# Patient Record
Sex: FEMALE | Race: WHITE | NOT HISPANIC OR LATINO | URBAN - METROPOLITAN AREA
[De-identification: names, ages, dates, MRNs, and addresses within clinical notes are randomized per-mention and may not be internally consistent; named-entity substitution may affect disease eponyms.]

---

## 2020-10-29 ENCOUNTER — OFFICE VISIT (OUTPATIENT)
Dept: URBAN - METROPOLITAN AREA TELEHEALTH 2 | Facility: TELEHEALTH | Age: 40
End: 2020-10-29

## 2020-10-29 RX ORDER — TRAZODONE HYDROCHLORIDE 50 MG/1
TAKE 1 TABLET (50 MG) BY ORAL ROUTE ONCE DAILY AT BEDTIME TABLET ORAL 1
Qty: 0 | Refills: 0 | Status: ACTIVE | COMMUNITY
Start: 1900-01-01 | End: 1900-01-01

## 2020-10-29 RX ORDER — ESZOPICLONE 3 MG/1
TAKE 1 TABLET (3 MG) BY ORAL ROUTE ONCE DAILY AT BEDTIME TABLET, COATED ORAL 1
Qty: 0 | Refills: 0 | Status: ACTIVE | COMMUNITY
Start: 1900-01-01 | End: 1900-01-01

## 2020-10-29 RX ORDER — HYOSCYAMINE SULFATE 0.12 MG/1
PLACE 1 TABLET UNDER TONGUE BY TRANSLINGUAL ROUTE 3 TIMES A DAY AS NEEDED FOR 30 DAYS TABLET, ORALLY DISINTEGRATING ORAL
Qty: 90 | Refills: 3 | Status: ACTIVE | COMMUNITY
Start: 2018-06-06 | End: 1900-01-01

## 2020-11-02 ENCOUNTER — WEB ENCOUNTER (OUTPATIENT)
Dept: URBAN - METROPOLITAN AREA CLINIC 80 | Facility: CLINIC | Age: 40
End: 2020-11-02

## 2020-11-02 ENCOUNTER — OFFICE VISIT (OUTPATIENT)
Dept: URBAN - METROPOLITAN AREA CLINIC 80 | Facility: CLINIC | Age: 40
End: 2020-11-02
Payer: COMMERCIAL

## 2020-11-02 DIAGNOSIS — Z80.0 FAMILY HISTORY OF COLON CANCER: ICD-10-CM

## 2020-11-02 DIAGNOSIS — K21.9 GERD: ICD-10-CM

## 2020-11-02 DIAGNOSIS — Z86.010 HISTORY OF COLON POLYPS: ICD-10-CM

## 2020-11-02 DIAGNOSIS — R19.4 CHANGE IN BOWEL HABITS: ICD-10-CM

## 2020-11-02 DIAGNOSIS — K92.1 MELENA: ICD-10-CM

## 2020-11-02 DIAGNOSIS — R10.9 ABDOMINAL PAIN: ICD-10-CM

## 2020-11-02 PROCEDURE — G8417 CALC BMI ABV UP PARAM F/U: HCPCS | Performed by: INTERNAL MEDICINE

## 2020-11-02 PROCEDURE — 99214 OFFICE O/P EST MOD 30 MIN: CPT | Performed by: INTERNAL MEDICINE

## 2020-11-02 PROCEDURE — G8484 FLU IMMUNIZE NO ADMIN: HCPCS | Performed by: INTERNAL MEDICINE

## 2020-11-02 PROCEDURE — G8427 DOCREV CUR MEDS BY ELIG CLIN: HCPCS | Performed by: INTERNAL MEDICINE

## 2020-11-02 PROCEDURE — G9903 PT SCRN TBCO ID AS NON USER: HCPCS | Performed by: INTERNAL MEDICINE

## 2020-11-02 RX ORDER — CLONAZEPAM 1 MG/1
1 TABLET TABLET ORAL ONCE A DAY
Status: ACTIVE | COMMUNITY

## 2020-11-02 RX ORDER — HYOSCYAMINE SULFATE 0.12 MG/1
PLACE 1 TABLET UNDER TONGUE BY TRANSLINGUAL ROUTE 3 TIMES A DAY AS NEEDED FOR 30 DAYS TABLET, ORALLY DISINTEGRATING ORAL
Qty: 90 | Refills: 3 | Status: DISCONTINUED | COMMUNITY
Start: 2018-06-06

## 2020-11-02 RX ORDER — SODIUM, POTASSIUM,MAG SULFATES 17.5-3.13G
17.5-13.3-1.6 GM/177ML SOLUTION, RECONSTITUTED, ORAL ORAL
Qty: 177 MILLILITER | Refills: 0 | OUTPATIENT

## 2020-11-02 RX ORDER — PANTOPRAZOLE SODIUM 40 MG/1
1 TABLET TABLET, DELAYED RELEASE ORAL ONCE A DAY
Qty: 90 TABLET | Refills: 3 | OUTPATIENT

## 2020-11-02 RX ORDER — HYOSCYAMINE SULFATE 0.12 MG/1
1 TABLET UNDER THE TONGUE AND ALLOW TO DISSOLVE  AS NEEDED TABLET ORAL; SUBLINGUAL THREE TIMES A DAY
Qty: 90 | Refills: 1 | OUTPATIENT

## 2020-11-02 RX ORDER — TRAZODONE HYDROCHLORIDE 50 MG/1
TAKE 1 TABLET (50 MG) BY ORAL ROUTE ONCE DAILY AT BEDTIME TABLET ORAL 1
Qty: 0 | Refills: 0 | Status: ACTIVE | COMMUNITY
Start: 1900-01-01

## 2020-11-02 RX ORDER — ESZOPICLONE 3 MG/1
TAKE 1 TABLET (3 MG) BY ORAL ROUTE ONCE DAILY AT BEDTIME TABLET, COATED ORAL 1
Qty: 0 | Refills: 0 | Status: ACTIVE | COMMUNITY
Start: 1900-01-01

## 2020-11-02 NOTE — PHYSICAL EXAM GASTROINTESTINAL
Abdomen , soft, TTP in the luq and suprapubic area.  no guarding, nondistended , no guarding or rigidity , no masses palpable , normal bowel sounds , Liver and Spleen , no hepatomegaly present.

## 2020-11-02 NOTE — HPI-TODAY'S VISIT:
This is an unscheduled follow-up visit for this patient last seen in 2018.    she is a 38 year old /White female here for abdominal pain.  it was thought to be constipation related. she was started on miralax and had a colonoscpy in 3/2018 with polyp noted.  egd in 2018 with gastritis but no hp/celiac she had a hida with ef of 60% she comes in for ongoing symptoms.  A copy of this document will be sent to the referring provider.   she notes ongoing pain that is periumbilical and luq/left flank area some low back pain no relief with any specific diet change pain can be severe can lasts an hour or so unchanged since it started.   constipation is improved on fiber.  she did have relief with linzess but she didn't refill it no hematochezia describes heartburn but no dysphagia weight fluctuates but largely stable she started noting 2 weeks of black stools stools are 4-7 on the bristol stool scale prior ER work up in 2018 which was unremarkable.   of note, she went to [facility] on 18 with ct notable for descending colon thickening with ? colitis. othewrise normal. cbc with normal h/h and wbc of 10, ua with blood. she was given antibiotics without improvement. she saw a gi physician with [facility] and was given more antibiotics.   dad  of colon cancer in his 50's.  she doesn't her other family hx.  last colon in 3/2018 with polyps noted. 3 yr f/u given.

## 2020-11-03 LAB
A/G RATIO: 1.6
ALBUMIN: 4.2
ALKALINE PHOSPHATASE: 103
ALT (SGPT): 9
AST (SGOT): 14
BASO (ABSOLUTE): 0.1
BASOS: 1
BILIRUBIN, TOTAL: 0.2
BUN/CREATININE RATIO: 9
BUN: 8
CALCIUM: 9.3
CARBON DIOXIDE, TOTAL: 23
CHLORIDE: 101
CREATININE: 0.87
EGFR IF AFRICN AM: 96
EGFR IF NONAFRICN AM: 83
EOS (ABSOLUTE): 0.1
EOS: 1
GLOBULIN, TOTAL: 2.6
GLUCOSE: 134
HEMATOCRIT: 38.7
HEMATOLOGY COMMENTS:: (no result)
HEMOGLOBIN: 12.8
IMMATURE CELLS: (no result)
IMMATURE GRANS (ABS): 0
IMMATURE GRANULOCYTES: 0
LIPASE: 16
LYMPHS (ABSOLUTE): 2.8
LYMPHS: 24
MCH: 29.6
MCHC: 33.1
MCV: 89
MONOCYTES(ABSOLUTE): 0.8
MONOCYTES: 7
NEUTROPHILS (ABSOLUTE): 8.1
NEUTROPHILS: 67
NRBC: (no result)
PLATELETS: 445
POTASSIUM: 4.1
PROTEIN, TOTAL: 6.8
RBC: 4.33
RDW: 12.1
SODIUM: 137
WBC: 11.8

## 2020-11-09 ENCOUNTER — CLAIMS CREATED FROM THE CLAIM WINDOW (OUTPATIENT)
Dept: URBAN - METROPOLITAN AREA CLINIC 4 | Facility: CLINIC | Age: 40
End: 2020-11-09
Payer: COMMERCIAL

## 2020-11-09 ENCOUNTER — OFFICE VISIT (OUTPATIENT)
Dept: URBAN - METROPOLITAN AREA SURGERY CENTER 19 | Facility: SURGERY CENTER | Age: 40
End: 2020-11-09
Payer: COMMERCIAL

## 2020-11-09 DIAGNOSIS — K31.89 OTHER DISEASES OF STOMACH AND DUODENUM: ICD-10-CM

## 2020-11-09 DIAGNOSIS — K31.89 ACQUIRED DEFORMITY OF DUODENUM: ICD-10-CM

## 2020-11-09 DIAGNOSIS — R10.13 ABDOMINAL DISCOMFORT, EPIGASTRIC: ICD-10-CM

## 2020-11-09 PROCEDURE — G8907 PT DOC NO EVENTS ON DISCHARG: HCPCS | Performed by: INTERNAL MEDICINE

## 2020-11-09 PROCEDURE — 43239 EGD BIOPSY SINGLE/MULTIPLE: CPT | Performed by: INTERNAL MEDICINE

## 2020-11-09 PROCEDURE — 88312 SPECIAL STAINS GROUP 1: CPT | Performed by: PATHOLOGY

## 2020-11-09 PROCEDURE — 88305 TISSUE EXAM BY PATHOLOGIST: CPT | Performed by: PATHOLOGY

## 2020-11-30 ENCOUNTER — ERX REFILL RESPONSE (OUTPATIENT)
Dept: URBAN - METROPOLITAN AREA CLINIC 80 | Facility: CLINIC | Age: 40
End: 2020-11-30

## 2020-11-30 RX ORDER — HYOSCYAMINE SULFATE 0.12 MG/1
1 TABLET UNDER THE TONGUE AND ALLOW TO DISSOLVE  AS NEEDED TABLET ORAL; SUBLINGUAL THREE TIMES A DAY
Qty: 270 | Refills: 0

## 2021-01-05 ENCOUNTER — ERX REFILL RESPONSE (OUTPATIENT)
Dept: URBAN - METROPOLITAN AREA CLINIC 80 | Facility: CLINIC | Age: 41
End: 2021-01-05

## 2021-01-05 RX ORDER — HYOSCYAMINE SULFATE 0.12 MG/1
1 TABLET UNDER THE TONGUE AND ALLOW TO DISSOLVE  AS NEEDED TABLET ORAL; SUBLINGUAL THREE TIMES A DAY
Qty: 90 | Refills: 0

## 2021-01-26 ENCOUNTER — CLAIMS CREATED FROM THE CLAIM WINDOW (OUTPATIENT)
Dept: URBAN - METROPOLITAN AREA CLINIC 4 | Facility: CLINIC | Age: 41
End: 2021-01-26
Payer: COMMERCIAL

## 2021-01-26 ENCOUNTER — OFFICE VISIT (OUTPATIENT)
Dept: URBAN - METROPOLITAN AREA SURGERY CENTER 19 | Facility: SURGERY CENTER | Age: 41
End: 2021-01-26
Payer: COMMERCIAL

## 2021-01-26 DIAGNOSIS — D12.2 BENIGN NEOPLASM OF ASCENDING COLON: ICD-10-CM

## 2021-01-26 DIAGNOSIS — Z86.010 H/O ADENOMATOUS POLYP OF COLON: ICD-10-CM

## 2021-01-26 DIAGNOSIS — K63.89 BACTERIAL OVERGROWTH SYNDROME: ICD-10-CM

## 2021-01-26 DIAGNOSIS — Z80.0 FAMILY HISTORY MALIGNANT NEOPLASM OF BILIARY TRACT: ICD-10-CM

## 2021-01-26 DIAGNOSIS — K63.89 OTHER SPECIFIED DISEASES OF INTESTINE: ICD-10-CM

## 2021-01-26 DIAGNOSIS — D12.2 ADENOMA OF ASCENDING COLON: ICD-10-CM

## 2021-01-26 PROCEDURE — 88305 TISSUE EXAM BY PATHOLOGIST: CPT | Performed by: PATHOLOGY

## 2021-01-26 PROCEDURE — 45380 COLONOSCOPY AND BIOPSY: CPT | Performed by: INTERNAL MEDICINE

## 2021-01-26 PROCEDURE — 45385 COLONOSCOPY W/LESION REMOVAL: CPT | Performed by: INTERNAL MEDICINE

## 2021-01-26 PROCEDURE — G8907 PT DOC NO EVENTS ON DISCHARG: HCPCS | Performed by: INTERNAL MEDICINE

## 2021-02-18 ENCOUNTER — ERX REFILL RESPONSE (OUTPATIENT)
Dept: URBAN - METROPOLITAN AREA CLINIC 13 | Facility: CLINIC | Age: 41
End: 2021-02-18

## 2021-02-18 RX ORDER — HYOSCYAMINE SULFATE 0.12 MG/1
DISSOLVE 1 TABLET UNDER THE TONGUE THREE TIMES DAILY AS NEEDED TABLET ORAL; SUBLINGUAL
Qty: 270 | Refills: 0

## 2021-05-24 ENCOUNTER — ERX REFILL RESPONSE (OUTPATIENT)
Dept: URBAN - METROPOLITAN AREA CLINIC 80 | Facility: CLINIC | Age: 41
End: 2021-05-24

## 2021-05-24 RX ORDER — HYOSCYAMINE SULFATE 0.12 MG/1
1 TABLET UNDER THE TONGUE AND ALLOW TO DISSOLVE  AS NEEDED TABLET SUBLINGUAL THREE TIMES A DAY
Qty: 270 | Refills: 0

## 2021-08-18 ENCOUNTER — ERX REFILL RESPONSE (OUTPATIENT)
Dept: URBAN - METROPOLITAN AREA CLINIC 80 | Facility: CLINIC | Age: 41
End: 2021-08-18

## 2021-08-18 RX ORDER — HYOSCYAMINE SULFATE 0.12 MG/1
DISSOLVE 1 TABLET UNDER THE TONGUE THREE TIMES DAILY AS NEEDED TABLET SUBLINGUAL
Qty: 270 TABLET | Refills: 1 | OUTPATIENT

## 2021-08-18 RX ORDER — HYOSCYAMINE SULFATE 0.12 MG/1
1 TABLET UNDER THE TONGUE AND ALLOW TO DISSOLVE  AS NEEDED TABLET SUBLINGUAL THREE TIMES A DAY
Qty: 270 | Refills: 0 | OUTPATIENT

## 2021-11-29 ENCOUNTER — ERX REFILL RESPONSE (OUTPATIENT)
Dept: URBAN - METROPOLITAN AREA CLINIC 80 | Facility: CLINIC | Age: 41
End: 2021-11-29

## 2021-11-29 RX ORDER — HYOSCYAMINE SULFATE 0.12 MG/1
DISSOLVE 1 TABLET UNDER THE TONGUE THREE TIMES DAILY AS NEEDED TABLET SUBLINGUAL
Qty: 270 TABLET | Refills: 1 | OUTPATIENT

## 2022-03-08 ENCOUNTER — ERX REFILL RESPONSE (OUTPATIENT)
Dept: URBAN - METROPOLITAN AREA CLINIC 80 | Facility: CLINIC | Age: 42
End: 2022-03-08

## 2022-03-08 RX ORDER — HYOSCYAMINE SULFATE 0.12 MG/1
DISSOLVE 1 TABLET UNDER THE TONGUE THREE TIMES DAILY AS NEEDED TABLET SUBLINGUAL
Qty: 270 TABLET | Refills: 1 | OUTPATIENT

## 2022-03-10 ENCOUNTER — TELEPHONE ENCOUNTER (OUTPATIENT)
Dept: URBAN - METROPOLITAN AREA CLINIC 128 | Facility: CLINIC | Age: 42
End: 2022-03-10

## 2022-03-10 RX ORDER — HYOSCYAMINE SULFATE 0.12 MG/1
DISSOLVE 1 TABLET UNDER THE TONGUE THREE TIMES DAILY AS NEEDED TABLET SUBLINGUAL
Qty: 270 TABLET | Refills: 1

## 2022-06-14 ENCOUNTER — ERX REFILL RESPONSE (OUTPATIENT)
Dept: URBAN - METROPOLITAN AREA CLINIC 80 | Facility: CLINIC | Age: 42
End: 2022-06-14

## 2022-06-14 RX ORDER — HYOSCYAMINE SULFATE 0.12 MG/1
DISSOLVE 1 TABLET UNDER THE TONGUE THREE TIMES DAILY AS NEEDED TABLET SUBLINGUAL
Qty: 270 TABLET | Refills: 0 | OUTPATIENT

## 2022-06-14 RX ORDER — HYOSCYAMINE SULFATE 0.12 MG/1
DISSOLVE 1 TABLET UNDER THE TONGUE THREE TIMES DAILY AS NEEDED TABLET SUBLINGUAL
Qty: 270 TABLET | Refills: 1 | OUTPATIENT

## 2022-10-19 ENCOUNTER — TELEPHONE ENCOUNTER (OUTPATIENT)
Dept: URBAN - METROPOLITAN AREA CLINIC 6 | Facility: CLINIC | Age: 42
End: 2022-10-19

## 2022-10-19 RX ORDER — DICYCLOMINE HYDROCHLORIDE 20 MG/1
1 TABLET TABLET ORAL
Qty: 180 | Refills: 1 | OUTPATIENT
Start: 2022-10-28 | End: 2023-04-26

## 2022-10-31 ENCOUNTER — WEB ENCOUNTER (OUTPATIENT)
Dept: URBAN - METROPOLITAN AREA CLINIC 80 | Facility: CLINIC | Age: 42
End: 2022-10-31

## 2022-10-31 ENCOUNTER — OFFICE VISIT (OUTPATIENT)
Dept: URBAN - METROPOLITAN AREA CLINIC 80 | Facility: CLINIC | Age: 42
End: 2022-10-31
Payer: COMMERCIAL

## 2022-10-31 VITALS
TEMPERATURE: 97.1 F | HEIGHT: 64 IN | HEART RATE: 81 BPM | BODY MASS INDEX: 35.96 KG/M2 | DIASTOLIC BLOOD PRESSURE: 75 MMHG | WEIGHT: 210.6 LBS | SYSTOLIC BLOOD PRESSURE: 116 MMHG

## 2022-10-31 DIAGNOSIS — K21.9 GERD: ICD-10-CM

## 2022-10-31 DIAGNOSIS — K92.1 MELENA: ICD-10-CM

## 2022-10-31 DIAGNOSIS — Z80.0 FAMILY HISTORY OF COLON CANCER: ICD-10-CM

## 2022-10-31 DIAGNOSIS — R19.4 CHANGE IN BOWEL HABITS: ICD-10-CM

## 2022-10-31 DIAGNOSIS — Z86.010 HISTORY OF COLON POLYPS: ICD-10-CM

## 2022-10-31 DIAGNOSIS — R10.9 ABDOMINAL PAIN: ICD-10-CM

## 2022-10-31 PROCEDURE — 99214 OFFICE O/P EST MOD 30 MIN: CPT | Performed by: INTERNAL MEDICINE

## 2022-10-31 RX ORDER — TRAZODONE HYDROCHLORIDE 50 MG/1
TAKE 1 TABLET (50 MG) BY ORAL ROUTE ONCE DAILY AT BEDTIME TABLET ORAL 1
Qty: 0 | Refills: 0 | Status: ACTIVE | COMMUNITY
Start: 1900-01-01

## 2022-10-31 RX ORDER — CLONAZEPAM 1 MG/1
1 TABLET TABLET ORAL ONCE A DAY
Status: ACTIVE | COMMUNITY

## 2022-10-31 RX ORDER — PANTOPRAZOLE SODIUM 40 MG/1
1 TABLET TABLET, DELAYED RELEASE ORAL ONCE A DAY
Qty: 90 TABLET | Refills: 3 | Status: ACTIVE | COMMUNITY

## 2022-10-31 RX ORDER — HYOSCYAMINE SULFATE 0.12 MG/1
DISSOLVE 1 TABLET UNDER THE TONGUE THREE TIMES DAILY AS NEEDED TABLET SUBLINGUAL
Qty: 270 TABLET | Refills: 0 | Status: ACTIVE | COMMUNITY

## 2022-10-31 RX ORDER — HYOSCYAMINE SULFATE 0.12 MG/1
1 TABLET UNDER THE TONGUE AND ALLOW TO DISSOLVE  AS NEEDED TABLET ORAL; SUBLINGUAL THREE TIMES A DAY
Qty: 90 | Refills: 1 | OUTPATIENT

## 2022-10-31 RX ORDER — CIPROFLOXACIN 500 MG/1
1 TABLET TABLET, FILM COATED ORAL
Qty: 28 TABLET | Refills: 0 | OUTPATIENT
Start: 2022-10-31 | End: 2022-11-13

## 2022-10-31 RX ORDER — ESZOPICLONE 3 MG/1
TAKE 1 TABLET (3 MG) BY ORAL ROUTE ONCE DAILY AT BEDTIME TABLET, COATED ORAL 1
Qty: 0 | Refills: 0 | Status: ACTIVE | COMMUNITY
Start: 1900-01-01

## 2022-10-31 RX ORDER — SODIUM, POTASSIUM,MAG SULFATES 17.5-3.13G
17.5-13.3-1.6 GM/177ML SOLUTION, RECONSTITUTED, ORAL ORAL
Qty: 177 MILLILITER | Refills: 0 | Status: ACTIVE | COMMUNITY

## 2022-10-31 RX ORDER — METRONIDAZOLE 500 MG/1
1 TABLET TABLET ORAL THREE TIMES A DAY
Qty: 42 TABLET | Refills: 0 | OUTPATIENT
Start: 2022-10-31 | End: 2022-11-13

## 2022-10-31 RX ORDER — DICYCLOMINE HYDROCHLORIDE 20 MG/1
1 TABLET TABLET ORAL
Qty: 180 | Refills: 1 | Status: ACTIVE | COMMUNITY
Start: 2022-10-28 | End: 2023-04-26

## 2022-10-31 NOTE — HPI-TODAY'S VISIT:
This is an unscheduled follow-up visit for 43 yo female last seen in 2020 for abd pain given protonix and had egd in 2020 with gastritis but no hp had colonoscpy in 3/2018 with polyps noted last colon in 2021 with polyps and IH.  3 yr f/u given.   she had a hida with ef of 60% she comes in for recurrent abd pain A copy of this document will be sent to the referring provider.   Pt return today with complaints of 2-3 weeks of LLQ abd pain w/o radiation(rated 10/10 at worse), bright red blood diarrhea. She has had abd pain prior but this is a new pain. The diarrhea started initially happening appx x4/day but has slowly started forming more and happening 5x over the last week total. Has chronic night sweats for over 10 years w/o WL or lymphadenopathy. The blood is bright red and mixed into the stool rathen than streaking. The pain coincides with the diarrhea, nothing makes better or worse otherwise. No travel, exposures to sick contacts, fever/chills. No h/o cardiac dx, DM. Does not in 2018 with colitis in the area found on CT.  dad  of colon cancer in his 50's.  she doesn't her other family hx.  last colon in 3/2018 with polyps noted. 3 yr f/u given.

## 2022-10-31 NOTE — PHYSICAL EXAM GASTROINTESTINAL
Abdomen , soft, tender to palp in the llq, nondistended , no guarding or rigidity , no masses palpable , normal bowel sounds , Liver and Spleen , no hepatomegaly present , no hepatosplenomegaly , liver nontender , spleen not palpable

## 2022-11-20 ENCOUNTER — WEB ENCOUNTER (OUTPATIENT)
Dept: URBAN - METROPOLITAN AREA CLINIC 80 | Facility: CLINIC | Age: 42
End: 2022-11-20

## 2022-11-20 RX ORDER — METRONIDAZOLE 500 MG/1
1 TABLET TABLET, FILM COATED ORAL THREE TIMES A DAY
Qty: 42 TABLET | Refills: 0 | OUTPATIENT
Start: 2022-11-21 | End: 2022-12-05

## 2022-11-20 RX ORDER — CIPROFLOXACIN HYDROCHLORIDE 500 MG/1
1 TABLET TABLET, FILM COATED ORAL
Qty: 28 TABLET | Refills: 0 | OUTPATIENT
Start: 2022-11-21 | End: 2022-12-05

## 2022-12-04 LAB — GASTROINTESTINAL PATHOGEN: (no result)

## 2022-12-12 ENCOUNTER — WEB ENCOUNTER (OUTPATIENT)
Dept: URBAN - METROPOLITAN AREA CLINIC 80 | Facility: CLINIC | Age: 42
End: 2022-12-12

## 2023-01-03 ENCOUNTER — OFFICE VISIT (OUTPATIENT)
Dept: URBAN - METROPOLITAN AREA CLINIC 80 | Facility: CLINIC | Age: 43
End: 2023-01-03
Payer: COMMERCIAL

## 2023-01-03 VITALS
TEMPERATURE: 96.9 F | HEIGHT: 64 IN | DIASTOLIC BLOOD PRESSURE: 77 MMHG | BODY MASS INDEX: 33.36 KG/M2 | HEART RATE: 88 BPM | WEIGHT: 195.4 LBS | SYSTOLIC BLOOD PRESSURE: 112 MMHG

## 2023-01-03 DIAGNOSIS — K92.1 MELENA: ICD-10-CM

## 2023-01-03 DIAGNOSIS — Z80.0 FAMILY HISTORY OF COLON CANCER: ICD-10-CM

## 2023-01-03 DIAGNOSIS — Z86.010 HISTORY OF COLON POLYPS: ICD-10-CM

## 2023-01-03 DIAGNOSIS — R10.9 ABDOMINAL PAIN: ICD-10-CM

## 2023-01-03 DIAGNOSIS — R19.4 CHANGE IN BOWEL HABITS: ICD-10-CM

## 2023-01-03 DIAGNOSIS — K21.9 GERD: ICD-10-CM

## 2023-01-03 PROBLEM — 235595009 GASTROESOPHAGEAL REFLUX DISEASE: Status: ACTIVE | Noted: 2020-11-02

## 2023-01-03 PROBLEM — 428283002 HISTORY OF POLYP OF COLON: Status: ACTIVE | Noted: 2020-11-02

## 2023-01-03 PROCEDURE — 99213 OFFICE O/P EST LOW 20 MIN: CPT | Performed by: INTERNAL MEDICINE

## 2023-01-03 RX ORDER — ESZOPICLONE 3 MG/1
TAKE 1 TABLET (3 MG) BY ORAL ROUTE ONCE DAILY AT BEDTIME TABLET, COATED ORAL 1
Qty: 0 | Refills: 0 | Status: ACTIVE | COMMUNITY
Start: 1900-01-01

## 2023-01-03 RX ORDER — PANTOPRAZOLE SODIUM 40 MG/1
1 TABLET TABLET, DELAYED RELEASE ORAL ONCE A DAY
Qty: 90 TABLET | Refills: 3 | Status: ACTIVE | COMMUNITY

## 2023-01-03 RX ORDER — DICYCLOMINE HYDROCHLORIDE 20 MG/1
1 TABLET TABLET ORAL
Qty: 180 | Refills: 1 | Status: ACTIVE | COMMUNITY
Start: 2022-10-28 | End: 2023-04-26

## 2023-01-03 RX ORDER — HYOSCYAMINE SULFATE 0.12 MG/1
1 TABLET UNDER THE TONGUE AND ALLOW TO DISSOLVE  AS NEEDED TABLET ORAL; SUBLINGUAL THREE TIMES A DAY
Qty: 90 | Refills: 1 | Status: ACTIVE | COMMUNITY

## 2023-01-03 RX ORDER — TRAZODONE HYDROCHLORIDE 50 MG/1
TAKE 1 TABLET (50 MG) BY ORAL ROUTE ONCE DAILY AT BEDTIME TABLET ORAL 1
Qty: 0 | Refills: 0 | Status: ACTIVE | COMMUNITY
Start: 1900-01-01

## 2023-01-03 RX ORDER — SODIUM, POTASSIUM,MAG SULFATES 17.5-3.13G
17.5-13.3-1.6 GM/177ML SOLUTION, RECONSTITUTED, ORAL ORAL
Qty: 177 MILLILITER | Refills: 0 | Status: ACTIVE | COMMUNITY

## 2023-01-03 RX ORDER — CLONAZEPAM 1 MG/1
1 TABLET TABLET ORAL ONCE A DAY
Status: ACTIVE | COMMUNITY

## 2023-01-03 RX ORDER — SIMETHICONE 125 MG/1
1 TABLET AFTER MEALS AND AT BEDTIME AS NEEDED TABLET, CHEWABLE ORAL
OUTPATIENT
Start: 2023-01-03

## 2023-01-03 RX ORDER — HYOSCYAMINE SULFATE 0.12 MG/1
DISSOLVE 1 TABLET UNDER THE TONGUE THREE TIMES DAILY AS NEEDED TABLET SUBLINGUAL
Qty: 270 TABLET | Refills: 0 | Status: ACTIVE | COMMUNITY

## 2023-01-03 NOTE — HPI-TODAY'S VISIT:
This is a scheduled follow-up visit for 41 yo female last seen in 10/31/2021 for symptoms of infectious colitis prior issues with gerd given protonix and had egd in 2020 with gastritis but no hp had colonoscpy in 3/2018 with polyps noted last colon in 2021 with polyps and IH.  3 yr f/u given.   she had a hida with ef of 60% work up for colitis last visit with normal stool test given cipro/flagyl she comes in for follow up A copy of this document will be sent to the referring provider.   she completed the antibiotics she is much improved now with no diarrhea or pain now notes more constipation with bloating and gas has occ bm with mucus and blood bloating worse with eating anything no n/v normal appetite stable weight good energy no fever or chills much improved from last visit but not asymptomatic.  she does note work up in 2018 with colitis found on CT.  dad  of colon cancer in his 50's.  she doesn't her other family hx.  last colon in 3/2018 with polyps noted.  colon in  with polyps.  3 yr f/u given.

## 2023-01-18 ENCOUNTER — TELEPHONE ENCOUNTER (OUTPATIENT)
Dept: URBAN - METROPOLITAN AREA CLINIC 6 | Facility: CLINIC | Age: 43
End: 2023-01-18

## 2023-01-18 RX ORDER — DICYCLOMINE HYDROCHLORIDE 20 MG/1
1 TABLET TABLET ORAL
Qty: 180 | Refills: 1
Start: 2022-10-28 | End: 2023-07-19

## 2023-01-20 ENCOUNTER — ERX REFILL RESPONSE (OUTPATIENT)
Dept: URBAN - METROPOLITAN AREA CLINIC 80 | Facility: CLINIC | Age: 43
End: 2023-01-20

## 2023-01-20 RX ORDER — HYOSCYAMINE SULFATE 0.12 MG/1
DISSOLVE 1 TABLET UNDER THE TONGUE THREE TIMES DAILY AS NEEDED TABLET SUBLINGUAL
Qty: 90 TABLET | Refills: 0 | OUTPATIENT

## 2023-01-20 RX ORDER — HYOSCYAMINE SULFATE 0.12 MG/1
1 TABLET UNDER THE TONGUE AND ALLOW TO DISSOLVE  AS NEEDED TABLET ORAL; SUBLINGUAL THREE TIMES A DAY
Qty: 90 | Refills: 1 | OUTPATIENT

## 2023-02-02 ENCOUNTER — OFFICE VISIT (OUTPATIENT)
Dept: URBAN - METROPOLITAN AREA SURGERY CENTER 31 | Facility: SURGERY CENTER | Age: 43
End: 2023-02-02

## 2023-05-18 ENCOUNTER — OFFICE VISIT (OUTPATIENT)
Dept: URBAN - METROPOLITAN AREA SURGERY CENTER 31 | Facility: SURGERY CENTER | Age: 43
End: 2023-05-18

## 2023-05-18 ENCOUNTER — CLAIMS CREATED FROM THE CLAIM WINDOW (OUTPATIENT)
Dept: URBAN - METROPOLITAN AREA CLINIC 4 | Facility: CLINIC | Age: 43
End: 2023-05-18
Payer: COMMERCIAL

## 2023-05-18 ENCOUNTER — CLAIMS CREATED FROM THE CLAIM WINDOW (OUTPATIENT)
Dept: URBAN - METROPOLITAN AREA SURGERY CENTER 31 | Facility: SURGERY CENTER | Age: 43
End: 2023-05-18
Payer: COMMERCIAL

## 2023-05-18 DIAGNOSIS — K92.1 HEMATOCHEZIA: ICD-10-CM

## 2023-05-18 DIAGNOSIS — K63.89 OTHER SPECIFIED DISEASES OF INTESTINE: ICD-10-CM

## 2023-05-18 DIAGNOSIS — K51.318 CHRONIC ULCERATIVE RECTOSIGMOIDITIS WITH OTHER COMPLICATION: ICD-10-CM

## 2023-05-18 DIAGNOSIS — R10.84 ABDOMINAL PAIN, GENERALIZED: ICD-10-CM

## 2023-05-18 PROCEDURE — G8907 PT DOC NO EVENTS ON DISCHARG: HCPCS | Performed by: INTERNAL MEDICINE

## 2023-05-18 PROCEDURE — 88305 TISSUE EXAM BY PATHOLOGIST: CPT | Performed by: PATHOLOGY

## 2023-05-18 PROCEDURE — 45331 SIGMOIDOSCOPY AND BIOPSY: CPT | Performed by: INTERNAL MEDICINE

## 2023-05-18 RX ORDER — DICYCLOMINE HYDROCHLORIDE 20 MG/1
1 TABLET TABLET ORAL
Qty: 180 | Refills: 1 | Status: ACTIVE | COMMUNITY
Start: 2022-10-28 | End: 2023-07-19

## 2023-05-18 RX ORDER — SODIUM, POTASSIUM,MAG SULFATES 17.5-3.13G
17.5-13.3-1.6 GM/177ML SOLUTION, RECONSTITUTED, ORAL ORAL
Qty: 177 MILLILITER | Refills: 0 | Status: ACTIVE | COMMUNITY

## 2023-05-18 RX ORDER — HYOSCYAMINE SULFATE 0.12 MG/1
DISSOLVE 1 TABLET UNDER THE TONGUE THREE TIMES DAILY AS NEEDED TABLET SUBLINGUAL
Qty: 90 TABLET | Refills: 0 | Status: ACTIVE | COMMUNITY

## 2023-05-18 RX ORDER — PANTOPRAZOLE SODIUM 40 MG/1
1 TABLET TABLET, DELAYED RELEASE ORAL ONCE A DAY
Qty: 90 TABLET | Refills: 3 | Status: ACTIVE | COMMUNITY

## 2023-05-18 RX ORDER — TRAZODONE HYDROCHLORIDE 50 MG/1
TAKE 1 TABLET (50 MG) BY ORAL ROUTE ONCE DAILY AT BEDTIME TABLET ORAL 1
Qty: 0 | Refills: 0 | Status: ACTIVE | COMMUNITY
Start: 1900-01-01

## 2023-05-18 RX ORDER — ESZOPICLONE 3 MG/1
TAKE 1 TABLET (3 MG) BY ORAL ROUTE ONCE DAILY AT BEDTIME TABLET, COATED ORAL 1
Qty: 0 | Refills: 0 | Status: ACTIVE | COMMUNITY
Start: 1900-01-01

## 2023-05-18 RX ORDER — SIMETHICONE 125 MG/1
1 TABLET AFTER MEALS AND AT BEDTIME AS NEEDED TABLET, CHEWABLE ORAL
Status: ACTIVE | COMMUNITY
Start: 2023-01-03

## 2023-05-18 RX ORDER — CLONAZEPAM 1 MG/1
1 TABLET TABLET ORAL ONCE A DAY
Status: ACTIVE | COMMUNITY

## 2023-06-08 ENCOUNTER — TELEPHONE ENCOUNTER (OUTPATIENT)
Dept: URBAN - METROPOLITAN AREA CLINIC 80 | Facility: CLINIC | Age: 43
End: 2023-06-08

## 2023-06-08 RX ORDER — BUDESONIDE 3 MG/1
3 CAPSULES CAPSULE ORAL ONCE A DAY
Qty: 180 CAPSULE | Refills: 0 | OUTPATIENT
Start: 2023-06-08

## 2023-07-05 ENCOUNTER — ERX REFILL RESPONSE (OUTPATIENT)
Dept: URBAN - METROPOLITAN AREA CLINIC 80 | Facility: CLINIC | Age: 43
End: 2023-07-05

## 2023-07-05 RX ORDER — BUDESONIDE 3 MG/1
3 CAPSULES CAPSULE ORAL ONCE A DAY
Qty: 180 CAPSULE | Refills: 0 | OUTPATIENT

## 2023-07-05 RX ORDER — BUDESONIDE 3 MG/1
TAKE 3 CAPSULES ORALLY ONCE A DAY 60 DAYS CAPSULE, GELATIN COATED ORAL
Qty: 90 CAPSULE | Refills: 1 | OUTPATIENT

## 2023-07-10 ENCOUNTER — OFFICE VISIT (OUTPATIENT)
Dept: URBAN - METROPOLITAN AREA CLINIC 80 | Facility: CLINIC | Age: 43
End: 2023-07-10
Payer: COMMERCIAL

## 2023-07-10 VITALS — BODY MASS INDEX: 32.5 KG/M2 | TEMPERATURE: 97.6 F | WEIGHT: 190.4 LBS | HEIGHT: 64 IN

## 2023-07-10 DIAGNOSIS — K51.318 CHRONIC ULCERATIVE RECTOSIGMOIDITIS WITH OTHER COMPLICATION: ICD-10-CM

## 2023-07-10 DIAGNOSIS — R10.84 ABDOMINAL PAIN, GENERALIZED: ICD-10-CM

## 2023-07-10 DIAGNOSIS — Z80.0 FAMILY HISTORY OF COLON CANCER: ICD-10-CM

## 2023-07-10 DIAGNOSIS — Z86.010 HISTORY OF COLON POLYPS: ICD-10-CM

## 2023-07-10 PROCEDURE — 99213 OFFICE O/P EST LOW 20 MIN: CPT | Performed by: INTERNAL MEDICINE

## 2023-07-10 RX ORDER — BUDESONIDE 3 MG/1
TAKE 3 CAPSULES ORALLY ONCE A DAY 60 DAYS CAPSULE, GELATIN COATED ORAL
Qty: 90 CAPSULE | Refills: 1 | Status: ACTIVE | COMMUNITY

## 2023-07-10 RX ORDER — PANTOPRAZOLE SODIUM 40 MG/1
1 TABLET TABLET, DELAYED RELEASE ORAL ONCE A DAY
Qty: 90 TABLET | Refills: 3 | Status: ACTIVE | COMMUNITY

## 2023-07-10 RX ORDER — CLONAZEPAM 1 MG/1
1 TABLET TABLET ORAL ONCE A DAY
Status: ACTIVE | COMMUNITY

## 2023-07-10 RX ORDER — HYOSCYAMINE SULFATE 0.12 MG/1
DISSOLVE 1 TABLET UNDER THE TONGUE THREE TIMES DAILY AS NEEDED TABLET SUBLINGUAL
Qty: 90 TABLET | Refills: 0 | Status: ACTIVE | COMMUNITY

## 2023-07-10 RX ORDER — SIMETHICONE 125 MG/1
1 TABLET AFTER MEALS AND AT BEDTIME AS NEEDED TABLET, CHEWABLE ORAL
Status: ACTIVE | COMMUNITY
Start: 2023-01-03

## 2023-07-10 RX ORDER — SIMETHICONE 125 MG/1
1 TABLET AFTER MEALS AND AT BEDTIME AS NEEDED TABLET, CHEWABLE ORAL
OUTPATIENT

## 2023-07-10 RX ORDER — MESALAMINE 375 MG/1
4 CAPSULES IN THE MORNING CAPSULE, EXTENDED RELEASE ORAL ONCE A DAY
Qty: 120 | OUTPATIENT
Start: 2023-07-10 | End: 2023-08-09

## 2023-07-10 RX ORDER — ESZOPICLONE 3 MG/1
TAKE 1 TABLET (3 MG) BY ORAL ROUTE ONCE DAILY AT BEDTIME TABLET, COATED ORAL 1
Qty: 0 | Refills: 0 | Status: ACTIVE | COMMUNITY
Start: 1900-01-01

## 2023-07-10 RX ORDER — TRAZODONE HYDROCHLORIDE 50 MG/1
TAKE 1 TABLET (50 MG) BY ORAL ROUTE ONCE DAILY AT BEDTIME TABLET ORAL 1
Qty: 0 | Refills: 0 | Status: ACTIVE | COMMUNITY
Start: 1900-01-01

## 2023-07-10 RX ORDER — DICYCLOMINE HYDROCHLORIDE 20 MG/1
1 TABLET TABLET ORAL
Qty: 180 | Refills: 1 | Status: ACTIVE | COMMUNITY
Start: 2022-10-28 | End: 2023-07-19

## 2023-07-10 RX ORDER — SODIUM, POTASSIUM,MAG SULFATES 17.5-3.13G
17.5-13.3-1.6 GM/177ML SOLUTION, RECONSTITUTED, ORAL ORAL
Qty: 177 MILLILITER | Refills: 0 | Status: ACTIVE | COMMUNITY

## 2023-07-10 NOTE — HPI-TODAY'S VISIT:
This is a scheduled follow-up visit for 42 yo female  followed for gerd.  recently seen for colitis issues had flex sig in 2023 with signs of ulcerateive proctosigmoidits here for follow up prior issues with gerd given protonix and had egd in 2020 with gastritis but no hp had colonoscpy in 3/2018 with polyps noted last colon in 2021 with polyps and IH.  3 yr f/u given.   she had a hida with ef of 60% work up for colitis last visit with normal stool test given cipro/flagyl A copy of this document will be sent to the referring provider.   she was started on budesonide after diagnosis of UC notes improved bm with alternating bm and some mucus.  no blood ongoing arthritis she does f/u with rheumatology for lupus energy is stable ongoing gas/bloating no n/v normal appetite no fever or chills no rash no other complaints  dad  of colon cancer in his 50's.  she doesn't her other family hx.  last colon in 3/2018 with polyps noted.  colon in  with polyps.  3 yr f/u given. flex sig in 2023 with UC proctosigmoiditis

## 2023-07-11 LAB
A/G RATIO: 1.6
ALBUMIN: 4
ALKALINE PHOSPHATASE: 58
ALT (SGPT): 13
AST (SGOT): 15
BILIRUBIN, TOTAL: 0.3
BUN/CREATININE RATIO: 15
BUN: 15
C-REACTIVE PROTEIN, QUANT: 6.6
CALCIUM: 9.1
CARBON DIOXIDE, TOTAL: 22
CHLORIDE: 108
CREATININE: 1.02
EGFR: 70
GLOBULIN, TOTAL: 2.5
GLUCOSE: 94
POTASSIUM: 4.5
PROTEIN, TOTAL: 6.5
SED RATE BY MODIFIED: 6
SODIUM: 137

## 2023-08-09 ENCOUNTER — ERX REFILL RESPONSE (OUTPATIENT)
Dept: URBAN - METROPOLITAN AREA CLINIC 80 | Facility: CLINIC | Age: 43
End: 2023-08-09

## 2023-08-09 RX ORDER — MESALAMINE 0.38 G/1
4 CAPSULES IN THE MORNING ORALLY ONCE A DAY CAPSULE, EXTENDED RELEASE ORAL
Qty: 120 CAPSULE | Refills: 0 | OUTPATIENT

## 2023-08-09 RX ORDER — MESALAMINE 375 MG/1
4 CAPSULES IN THE MORNING CAPSULE, EXTENDED RELEASE ORAL ONCE A DAY
Qty: 120 | OUTPATIENT

## 2023-08-09 RX ORDER — BUDESONIDE 3 MG/1
TAKE 3 CAPSULES ORALLY ONCE A DAY 60 DAYS CAPSULE, GELATIN COATED ORAL
Qty: 90 CAPSULE | Refills: 1 | OUTPATIENT

## 2023-08-09 RX ORDER — BUDESONIDE 3 MG/1
TAKE 3 CAPSULES BY MOUTH EVERY DAY CAPSULE, GELATIN COATED ORAL
Qty: 90 CAPSULE | Refills: 1 | OUTPATIENT

## 2023-09-12 ENCOUNTER — OFFICE VISIT (OUTPATIENT)
Dept: URBAN - METROPOLITAN AREA CLINIC 80 | Facility: CLINIC | Age: 43
End: 2023-09-12

## 2023-12-06 ENCOUNTER — TELEPHONE ENCOUNTER (OUTPATIENT)
Dept: URBAN - METROPOLITAN AREA CLINIC 6 | Facility: CLINIC | Age: 43
End: 2023-12-06

## 2023-12-06 RX ORDER — DICYCLOMINE HYDROCHLORIDE 20 MG/1
1 TABLET TABLET ORAL
Qty: 180 | Refills: 1 | OUTPATIENT
Start: 2023-12-08 | End: 2024-06-05

## 2023-12-26 ENCOUNTER — LAB OUTSIDE AN ENCOUNTER (OUTPATIENT)
Dept: URBAN - METROPOLITAN AREA CLINIC 80 | Facility: CLINIC | Age: 43
End: 2023-12-26

## 2023-12-27 LAB
A/G RATIO: 1.6
ALBUMIN: 4.2
ALKALINE PHOSPHATASE: 66
ALT (SGPT): 8
AST (SGOT): 16
BILIRUBIN, TOTAL: 0.3
BUN/CREATININE RATIO: (no result)
BUN: 11
C-REACTIVE PROTEIN, QUANT: 8.6
CALCIUM: 9
CARBON DIOXIDE, TOTAL: 24
CHLORIDE: 105
CREATININE: 0.81
EGFR: 92
GLOBULIN, TOTAL: 2.6
GLUCOSE: 86
POTASSIUM: 4.9
PROTEIN, TOTAL: 6.8
SED RATE BY MODIFIED: 6
SODIUM: 138

## 2024-02-05 ENCOUNTER — OV EP (OUTPATIENT)
Dept: URBAN - METROPOLITAN AREA CLINIC 80 | Facility: CLINIC | Age: 44
End: 2024-02-05
Payer: COMMERCIAL

## 2024-02-05 VITALS
TEMPERATURE: 97.2 F | DIASTOLIC BLOOD PRESSURE: 77 MMHG | WEIGHT: 191.2 LBS | HEART RATE: 79 BPM | HEIGHT: 64 IN | SYSTOLIC BLOOD PRESSURE: 121 MMHG | BODY MASS INDEX: 32.64 KG/M2

## 2024-02-05 DIAGNOSIS — Z80.0 FAMILY HISTORY OF COLON CANCER: ICD-10-CM

## 2024-02-05 DIAGNOSIS — R19.4 CHANGE IN BOWEL HABITS: ICD-10-CM

## 2024-02-05 DIAGNOSIS — K92.1 MELENA: ICD-10-CM

## 2024-02-05 DIAGNOSIS — R10.9 ABDOMINAL PAIN: ICD-10-CM

## 2024-02-05 DIAGNOSIS — K51.30 ULCERATIVE RECTOSIGMOIDITIS: ICD-10-CM

## 2024-02-05 DIAGNOSIS — K21.9 GERD: ICD-10-CM

## 2024-02-05 DIAGNOSIS — K51.318 CHRONIC ULCERATIVE RECTOSIGMOIDITIS WITH OTHER COMPLICATION: ICD-10-CM

## 2024-02-05 DIAGNOSIS — Z86.010 HISTORY OF COLON POLYPS: ICD-10-CM

## 2024-02-05 PROCEDURE — 99214 OFFICE O/P EST MOD 30 MIN: CPT | Performed by: INTERNAL MEDICINE

## 2024-02-05 RX ORDER — FAMOTIDINE 20 MG/1
1 TABLET AS NEEDED TABLET, FILM COATED ORAL
Qty: 180 | Refills: 2 | OUTPATIENT
Start: 2024-02-05

## 2024-02-05 RX ORDER — TRAZODONE HYDROCHLORIDE 50 MG/1
TAKE 1 TABLET (50 MG) BY ORAL ROUTE ONCE DAILY AT BEDTIME TABLET ORAL 1
Qty: 0 | Refills: 0 | Status: ACTIVE | COMMUNITY
Start: 1900-01-01

## 2024-02-05 RX ORDER — SODIUM, POTASSIUM,MAG SULFATES 17.5-3.13G
17.5-13.3-1.6 GM/177ML SOLUTION, RECONSTITUTED, ORAL ORAL
Qty: 177 MILLILITER | Refills: 0 | Status: ON HOLD | COMMUNITY

## 2024-02-05 RX ORDER — BUDESONIDE 3 MG/1
TAKE 3 CAPSULES BY MOUTH EVERY DAY CAPSULE, GELATIN COATED ORAL
Qty: 90 CAPSULE | Refills: 1 | Status: ON HOLD | COMMUNITY

## 2024-02-05 RX ORDER — TOPIRAMATE 50 MG/1
1 TABLET TABLET, COATED ORAL ONCE A DAY
Status: ACTIVE | COMMUNITY

## 2024-02-05 RX ORDER — CLONAZEPAM 1 MG/1
1 TABLET TABLET ORAL ONCE A DAY
Status: ACTIVE | COMMUNITY

## 2024-02-05 RX ORDER — HYOSCYAMINE SULFATE 0.12 MG/1
DISSOLVE 1 TABLET UNDER THE TONGUE THREE TIMES DAILY AS NEEDED TABLET SUBLINGUAL
Qty: 90 TABLET | Refills: 0 | Status: ACTIVE | COMMUNITY

## 2024-02-05 RX ORDER — ESZOPICLONE 3 MG/1
TAKE 1 TABLET (3 MG) BY ORAL ROUTE ONCE DAILY AT BEDTIME TABLET, COATED ORAL 1
Qty: 0 | Refills: 0 | Status: ACTIVE | COMMUNITY
Start: 1900-01-01

## 2024-02-05 RX ORDER — PANTOPRAZOLE SODIUM 40 MG/1
1 TABLET TABLET, DELAYED RELEASE ORAL ONCE A DAY
Qty: 90 TABLET | Refills: 3 | Status: ON HOLD | COMMUNITY

## 2024-02-05 RX ORDER — DICYCLOMINE HYDROCHLORIDE 20 MG/1
1 TABLET TABLET ORAL
Qty: 180 | Refills: 1 | Status: ACTIVE | COMMUNITY
Start: 2023-12-08 | End: 2024-06-05

## 2024-02-05 RX ORDER — HYDROXYCHLOROQUINE SULFATE 200 MG/1
AS DIRECTED TABLET, FILM COATED ORAL
Status: ACTIVE | COMMUNITY

## 2024-02-05 RX ORDER — SOD SULF/POT CHLORIDE/MAG SULF 1.479 G
12 TABLETS THE FIRST DOSE THE EVENING BEFORE AND SECOND DOSE THE MORNING OF COLONOSCOPY TABLET ORAL TWICE A DAY
Qty: 24 | OUTPATIENT
Start: 2024-02-05 | End: 2024-02-06

## 2024-02-05 RX ORDER — MESALAMINE 0.38 G/1
4 CAPSULES IN THE MORNING ORALLY ONCE A DAY CAPSULE, EXTENDED RELEASE ORAL
Qty: 120 CAPSULE | Refills: 0 | Status: ON HOLD | COMMUNITY

## 2024-02-05 RX ORDER — SIMETHICONE 125 MG/1
1 TABLET AFTER MEALS AND AT BEDTIME AS NEEDED TABLET, CHEWABLE ORAL
Status: ACTIVE | COMMUNITY

## 2024-02-05 NOTE — HPI-TODAY'S VISIT:
Patient is a pleasant 45 yo female followed for ulcerative proctosigmoiditis had flex sig in 2023 with signs of ulcerateive proctosigmoidits started on mesalamine in 2023 and here for follow up prior issues with gerd given protonix and had egd in 2020 with gastritis but no hp had colonoscpy in 3/2018 with polyps noted last colon in 2021 with polyps and IH.  3 yr f/u given.   she had a hida with ef of 60% work up for colitis last visit with normal stool test given cipro/flagyl A copy of this document will be sent to the referring provider.   pt has hx of lupus and now RA started on on sympony by rheumatologist for treatment last week she did not start the mesalamine prescribed noting that she is on alot of medications already she notes improved but ongoing symptoms notes llq pain occuring 1-2 times per week notes issues with constipation with periods of 3-5 bm/day notes improved but ongoing blood and mucus in the stool.  occuring essentially daily but smaller volume responds to bentyl eating well notes no n/v stable weight energy is poor ongoing arthritis she does f/u with rheumatology for lupus and takes plaquenil for it no fever or chills no rash no other complaints  dad  of colon cancer in his 50's.  she doesn't her other family hx.  last colon in 3/2018 with polyps noted.  colon in  with polyps.  3 yr f/u given. flex sig in 2023 with UC proctosigmoiditis

## 2024-05-06 ENCOUNTER — OFFICE VISIT (OUTPATIENT)
Dept: URBAN - METROPOLITAN AREA SURGERY CENTER 19 | Facility: SURGERY CENTER | Age: 44
End: 2024-05-06

## 2024-10-24 ENCOUNTER — TELEPHONE ENCOUNTER (OUTPATIENT)
Dept: URBAN - METROPOLITAN AREA CLINIC 80 | Facility: CLINIC | Age: 44
End: 2024-10-24

## 2024-10-28 ENCOUNTER — OFFICE VISIT (OUTPATIENT)
Dept: URBAN - METROPOLITAN AREA SURGERY CENTER 19 | Facility: SURGERY CENTER | Age: 44
End: 2024-10-28

## 2024-10-28 ENCOUNTER — CLAIMS CREATED FROM THE CLAIM WINDOW (OUTPATIENT)
Dept: URBAN - METROPOLITAN AREA CLINIC 4 | Facility: CLINIC | Age: 44
End: 2024-10-28
Payer: COMMERCIAL

## 2024-10-28 DIAGNOSIS — K51.80 OTHER ULCERATIVE COLITIS WITHOUT COMPLICATIONS: ICD-10-CM

## 2024-10-28 DIAGNOSIS — K63.89 OTHER SPECIFIED DISEASES OF INTESTINE: ICD-10-CM

## 2024-10-28 PROCEDURE — 88305 TISSUE EXAM BY PATHOLOGIST: CPT | Performed by: PATHOLOGY

## 2024-10-28 RX ORDER — FAMOTIDINE 20 MG/1
1 TABLET AS NEEDED TABLET, FILM COATED ORAL
Qty: 180 | Refills: 2 | Status: ACTIVE | COMMUNITY
Start: 2024-02-05

## 2024-10-28 RX ORDER — SIMETHICONE 125 MG/1
1 TABLET AFTER MEALS AND AT BEDTIME AS NEEDED TABLET, CHEWABLE ORAL
Status: ACTIVE | COMMUNITY

## 2024-10-28 RX ORDER — CLONAZEPAM 1 MG/1
1 TABLET TABLET ORAL ONCE A DAY
Status: ACTIVE | COMMUNITY

## 2024-10-28 RX ORDER — SODIUM, POTASSIUM,MAG SULFATES 17.5-3.13G
17.5-13.3-1.6 GM/177ML SOLUTION, RECONSTITUTED, ORAL ORAL
Qty: 177 MILLILITER | Refills: 0 | Status: ON HOLD | COMMUNITY

## 2024-10-28 RX ORDER — MESALAMINE 0.38 G/1
4 CAPSULES IN THE MORNING ORALLY ONCE A DAY CAPSULE, EXTENDED RELEASE ORAL
Qty: 120 CAPSULE | Refills: 0 | Status: ON HOLD | COMMUNITY

## 2024-10-28 RX ORDER — TOPIRAMATE 50 MG/1
1 TABLET TABLET, COATED ORAL ONCE A DAY
Status: ACTIVE | COMMUNITY

## 2024-10-28 RX ORDER — PANTOPRAZOLE SODIUM 40 MG/1
1 TABLET TABLET, DELAYED RELEASE ORAL ONCE A DAY
Qty: 90 TABLET | Refills: 3 | Status: ON HOLD | COMMUNITY

## 2024-10-28 RX ORDER — BUDESONIDE 3 MG/1
TAKE 3 CAPSULES BY MOUTH EVERY DAY CAPSULE, GELATIN COATED ORAL
Qty: 90 CAPSULE | Refills: 1 | Status: ON HOLD | COMMUNITY

## 2024-10-28 RX ORDER — ESZOPICLONE 3 MG/1
TAKE 1 TABLET (3 MG) BY ORAL ROUTE ONCE DAILY AT BEDTIME TABLET, COATED ORAL 1
Qty: 0 | Refills: 0 | Status: ACTIVE | COMMUNITY
Start: 1900-01-01

## 2024-10-28 RX ORDER — TRAZODONE HYDROCHLORIDE 50 MG/1
TAKE 1 TABLET (50 MG) BY ORAL ROUTE ONCE DAILY AT BEDTIME TABLET ORAL 1
Qty: 0 | Refills: 0 | Status: ACTIVE | COMMUNITY
Start: 1900-01-01

## 2024-10-28 RX ORDER — HYOSCYAMINE SULFATE 0.12 MG/1
DISSOLVE 1 TABLET UNDER THE TONGUE THREE TIMES DAILY AS NEEDED TABLET SUBLINGUAL
Qty: 90 TABLET | Refills: 0 | Status: ACTIVE | COMMUNITY

## 2024-10-28 RX ORDER — HYDROXYCHLOROQUINE SULFATE 200 MG/1
AS DIRECTED TABLET, FILM COATED ORAL
Status: ACTIVE | COMMUNITY

## 2025-08-30 ENCOUNTER — CLAIMS CREATED FROM THE CLAIM WINDOW (OUTPATIENT)
Dept: URBAN - METROPOLITAN AREA MEDICAL CENTER 18 | Facility: MEDICAL CENTER | Age: 45
End: 2025-08-30
Payer: COMMERCIAL

## 2025-08-30 DIAGNOSIS — R10.32 LEFT LOWER QUADRANT PAIN: ICD-10-CM

## 2025-08-30 PROCEDURE — 99253 IP/OBS CNSLTJ NEW/EST LOW 45: CPT | Performed by: INTERNAL MEDICINE
